# Patient Record
Sex: FEMALE | Race: WHITE | Employment: OTHER | URBAN - METROPOLITAN AREA
[De-identification: names, ages, dates, MRNs, and addresses within clinical notes are randomized per-mention and may not be internally consistent; named-entity substitution may affect disease eponyms.]

---

## 2017-10-05 ENCOUNTER — TELEPHONE (OUTPATIENT)
Dept: PULMONOLOGY | Facility: CLINIC | Age: 57
End: 2017-10-05

## 2017-10-05 RX ORDER — ATORVASTATIN CALCIUM 10 MG/1
10 TABLET, FILM COATED ORAL DAILY
Qty: 90 TABLET | Refills: 4 | Status: SHIPPED | OUTPATIENT
Start: 2017-10-05 | End: 2017-11-09

## 2017-10-05 RX ORDER — LISINOPRIL 10 MG/1
10 TABLET ORAL DAILY
Qty: 90 TABLET | Refills: 4 | Status: SHIPPED | OUTPATIENT
Start: 2017-10-05 | End: 2017-11-09

## 2017-10-05 NOTE — TELEPHONE ENCOUNTER
Current Outpatient Prescriptions:  Atorvastatin Calcium 10 MG Oral Tab Take 1 tablet (10 mg total) by mouth daily. Disp: 90 tablet Rfl: 4   lisinopril 10 MG Oral Tab Take 1 tablet (10 mg total) by mouth daily.  Disp: 90 tablet Rfl: 4

## 2017-10-24 NOTE — TELEPHONE ENCOUNTER
Pt calling back - states pharm never received rx - they faxed over something this morning to the office

## 2017-10-25 NOTE — TELEPHONE ENCOUNTER
Pt calling back again very upset states she never received a call back regarding if office received fax from pharmacy. Pt states pharmacy has not received scripts and is requesting to speak to RN. Please call [phone].  Please fax new scripts to [facility]

## 2017-10-25 NOTE — TELEPHONE ENCOUNTER
Madeline from [employer] states the pt has 2 profiles with 2 different addresses. Madeline pharmacist states the medications were received and are under the \[facility]\"" address.   Madeline pharmacist states she needs pt to verify which address is current

## 2017-10-25 NOTE — TELEPHONE ENCOUNTER
Christy Kade states her current address is the [place] address. [address] notified current address is [place] address. Benedict Callejas states he will merge the 2 accounts and mail atorvastatin and lisinopril [facility].   Ida add

## 2017-10-25 NOTE — TELEPHONE ENCOUNTER
[facility] did not receive prescriptions from 10-5-17. Madeline notified no fax received from [place]. Madeline notified this office will call [employer].

## 2017-11-08 NOTE — TELEPHONE ENCOUNTER
Pt states he is currently still having trouble obtaining his medication atorvastatin and  Lisinopril from mail order pharmacy due to insurance complications. Pt would like to receive a call back regarding if office has any samples of medications.  Please ca

## 2017-11-08 NOTE — TELEPHONE ENCOUNTER
LMTCB--no samples of any kind of med in clinic, does pt have a local pharm she wants a refill sent to?

## 2017-11-08 NOTE — TELEPHONE ENCOUNTER
LMTCB--stting no samples, cb if she would like to leave the name of a local pharmacy to send refills to

## 2017-11-09 RX ORDER — LISINOPRIL 10 MG/1
10 TABLET ORAL DAILY
Qty: 90 TABLET | Refills: 0 | Status: SHIPPED | OUTPATIENT
Start: 2017-11-09

## 2017-11-09 RX ORDER — ATORVASTATIN CALCIUM 10 MG/1
10 TABLET, FILM COATED ORAL DAILY
Qty: 90 TABLET | Refills: 0 | Status: SHIPPED | OUTPATIENT
Start: 2017-11-09 | End: 2021-11-24

## 2017-11-09 NOTE — TELEPHONE ENCOUNTER
LOV: 8/22/16  LRF: 10/5/17    Pt requesting refill be sent to local pharm, as she is having ins issues with mail order pharm.     rx sent to [facility]

## 2018-06-28 ENCOUNTER — OFFICE VISIT (OUTPATIENT)
Dept: PULMONOLOGY | Facility: CLINIC | Age: 58
End: 2018-06-28

## 2018-06-28 VITALS
SYSTOLIC BLOOD PRESSURE: 125 MMHG | DIASTOLIC BLOOD PRESSURE: 85 MMHG | HEART RATE: 69 BPM | RESPIRATION RATE: 18 BRPM | BODY MASS INDEX: 30.01 KG/M2 | WEIGHT: 198 LBS | HEIGHT: 68 IN | OXYGEN SATURATION: 98 %

## 2018-06-28 DIAGNOSIS — I11.9 HYPERTENSIVE HEART DISEASE WITHOUT HEART FAILURE: Primary | ICD-10-CM

## 2018-06-28 DIAGNOSIS — E78.5 DYSLIPIDEMIA: ICD-10-CM

## 2018-06-28 PROCEDURE — 99213 OFFICE O/P EST LOW 20 MIN: CPT | Performed by: INTERNAL MEDICINE

## 2018-06-28 PROCEDURE — 99212 OFFICE O/P EST SF 10 MIN: CPT | Performed by: INTERNAL MEDICINE

## 2018-06-28 NOTE — PROGRESS NOTES
The patient is 53-year-old female who I know well from prior evaluation comes in for follow-up. She notes that she is doing well. She had some tingling in her hands.     Review of systems: Vision normal. Ear nose and throat normal. Bowel normal. Bladder f

## 2018-07-23 ENCOUNTER — APPOINTMENT (OUTPATIENT)
Dept: LAB | Facility: HOSPITAL | Age: 58
End: 2018-07-23
Attending: INTERNAL MEDICINE
Payer: COMMERCIAL

## 2018-07-23 DIAGNOSIS — E78.5 DYSLIPIDEMIA: ICD-10-CM

## 2018-07-23 LAB
ALBUMIN SERPL BCP-MCNC: 4.6 G/DL (ref 3.5–4.8)
ALP SERPL-CCNC: 71 U/L (ref 32–100)
ALT SERPL-CCNC: 62 U/L (ref 14–54)
AST SERPL-CCNC: 37 U/L (ref 15–41)
BILIRUB DIRECT SERPL-MCNC: 0.2 MG/DL (ref 0–0.2)
BILIRUB SERPL-MCNC: 0.6 MG/DL (ref 0.3–1.2)
CHOLEST SERPL-MCNC: 219 MG/DL (ref 110–200)
HDLC SERPL-MCNC: 49 MG/DL
LDLC SERPL CALC-MCNC: 127 MG/DL (ref 0–99)
NONHDLC SERPL-MCNC: 170 MG/DL
PROT SERPL-MCNC: 7.4 G/DL (ref 5.9–8.4)
TRIGL SERPL-MCNC: 217 MG/DL (ref 1–149)

## 2018-07-23 PROCEDURE — 80061 LIPID PANEL: CPT

## 2018-07-23 PROCEDURE — 80076 HEPATIC FUNCTION PANEL: CPT

## 2018-07-23 PROCEDURE — 36415 COLL VENOUS BLD VENIPUNCTURE: CPT

## 2018-07-30 ENCOUNTER — TELEPHONE (OUTPATIENT)
Dept: PULMONOLOGY | Facility: CLINIC | Age: 58
End: 2018-07-30

## 2018-07-30 DIAGNOSIS — E78.5 HYPERLIPIDEMIA, UNSPECIFIED HYPERLIPIDEMIA TYPE: Primary | ICD-10-CM

## 2018-07-30 NOTE — TELEPHONE ENCOUNTER
----- Message from Evangelist Erickson MD sent at 7/26/2018  9:24 PM CDT -----  RN, please call the patient and clarify if she has actually been taking her atorvastatin. Her cholesterol jumped significantly.   If she has been taking the 10 mg dose, increase t

## 2018-07-31 NOTE — TELEPHONE ENCOUNTER
Pt reports taking atorvastatin 10 mg, she does report she was just on vacation prior to having labs drawn and did not eat well, consumed alcohol, and may have skipped some doses.  Pt would like to continue with Atorvastatin 10 mg daily and watch a healthy d

## 2018-10-05 ENCOUNTER — TELEPHONE (OUTPATIENT)
Dept: PULMONOLOGY | Facility: CLINIC | Age: 58
End: 2018-10-05

## 2018-12-19 ENCOUNTER — APPOINTMENT (OUTPATIENT)
Dept: LAB | Facility: HOSPITAL | Age: 58
End: 2018-12-19
Attending: INTERNAL MEDICINE
Payer: COMMERCIAL

## 2018-12-19 DIAGNOSIS — E78.5 HYPERLIPIDEMIA, UNSPECIFIED HYPERLIPIDEMIA TYPE: ICD-10-CM

## 2018-12-19 PROCEDURE — 80061 LIPID PANEL: CPT

## 2018-12-19 PROCEDURE — 80076 HEPATIC FUNCTION PANEL: CPT

## 2018-12-19 PROCEDURE — 36415 COLL VENOUS BLD VENIPUNCTURE: CPT

## 2019-01-04 RX ORDER — ATORVASTATIN CALCIUM 10 MG/1
TABLET, FILM COATED ORAL
Qty: 90 TABLET | Refills: 0 | Status: SHIPPED | OUTPATIENT
Start: 2019-01-04 | End: 2019-04-16

## 2019-01-04 RX ORDER — LISINOPRIL 10 MG/1
TABLET ORAL
Qty: 90 TABLET | Refills: 0 | Status: SHIPPED | OUTPATIENT
Start: 2019-01-04 | End: 2019-04-16

## 2019-04-16 RX ORDER — ATORVASTATIN CALCIUM 10 MG/1
TABLET, FILM COATED ORAL
Qty: 90 TABLET | Refills: 0 | Status: SHIPPED | OUTPATIENT
Start: 2019-04-16 | End: 2019-07-08

## 2019-04-16 RX ORDER — LISINOPRIL 10 MG/1
TABLET ORAL
Qty: 90 TABLET | Refills: 0 | Status: SHIPPED | OUTPATIENT
Start: 2019-04-16 | End: 2019-07-08

## 2019-04-16 NOTE — TELEPHONE ENCOUNTER
Requested medication:   LISINOPRIL 10 MG Oral Tab  Last refilled: 1/4/19 #90 tab with 0 refills    ATORVASTATIN 10 MG Oral Tab  Last refilled: 1/4/19 #90 tab with 0 refills    LOV: 6/28/18   No future appointments. Assessment and plan:  1.   Occasional t

## 2019-07-08 RX ORDER — ATORVASTATIN CALCIUM 10 MG/1
TABLET, FILM COATED ORAL
Qty: 90 TABLET | Refills: 0 | Status: SHIPPED | OUTPATIENT
Start: 2019-07-08 | End: 2019-09-23

## 2019-07-08 RX ORDER — LISINOPRIL 10 MG/1
TABLET ORAL
Qty: 90 TABLET | Refills: 0 | Status: SHIPPED | OUTPATIENT
Start: 2019-07-08 | End: 2019-09-23

## 2019-07-26 ENCOUNTER — TELEPHONE (OUTPATIENT)
Dept: PULMONOLOGY | Facility: CLINIC | Age: 59
End: 2019-07-26

## 2019-07-26 NOTE — TELEPHONE ENCOUNTER
Incoming call from [facility] office stating they received a fax stating TANNER Ro ordered labs that pt missed.  office was wondering why did they received this fax. Informed  office to disregard fax. Fax should've went directly to pt.   v

## 2019-09-23 RX ORDER — LISINOPRIL 10 MG/1
TABLET ORAL
Qty: 90 TABLET | Refills: 0 | Status: SHIPPED | OUTPATIENT
Start: 2019-09-23 | End: 2019-12-11

## 2019-09-23 RX ORDER — ATORVASTATIN CALCIUM 10 MG/1
TABLET, FILM COATED ORAL
Qty: 90 TABLET | Refills: 0 | Status: SHIPPED | OUTPATIENT
Start: 2019-09-23 | End: 2019-12-11

## 2019-09-23 NOTE — TELEPHONE ENCOUNTER
Lisinopril  Last filled: 7-8-19 #90 tab with 0 refills    Atorvastatin  Last filled: 7-8-19 #90 tab with 0 refills    Last seen: 6-28-18      Please advise.

## 2019-12-11 RX ORDER — ATORVASTATIN CALCIUM 10 MG/1
TABLET, FILM COATED ORAL
Qty: 90 TABLET | Refills: 0 | Status: SHIPPED | OUTPATIENT
Start: 2019-12-11 | End: 2020-02-28

## 2019-12-11 RX ORDER — LISINOPRIL 10 MG/1
TABLET ORAL
Qty: 90 TABLET | Refills: 0 | Status: SHIPPED | OUTPATIENT
Start: 2019-12-11 | End: 2020-02-28

## 2020-02-28 RX ORDER — LISINOPRIL 10 MG/1
TABLET ORAL
Qty: 90 TABLET | Refills: 0 | Status: SHIPPED | OUTPATIENT
Start: 2020-02-28 | End: 2020-05-21

## 2020-02-28 RX ORDER — ATORVASTATIN CALCIUM 10 MG/1
TABLET, FILM COATED ORAL
Qty: 90 TABLET | Refills: 0 | Status: SHIPPED | OUTPATIENT
Start: 2020-02-28 | End: 2020-05-21

## 2020-05-22 RX ORDER — ATORVASTATIN CALCIUM 10 MG/1
TABLET, FILM COATED ORAL
Qty: 90 TABLET | Refills: 5 | Status: SHIPPED | OUTPATIENT
Start: 2020-05-22 | End: 2021-08-05

## 2020-05-22 RX ORDER — LISINOPRIL 10 MG/1
TABLET ORAL
Qty: 90 TABLET | Refills: 5 | Status: SHIPPED | OUTPATIENT
Start: 2020-05-22 | End: 2021-08-12

## 2020-05-25 RX ORDER — LISINOPRIL 10 MG/1
TABLET ORAL
Qty: 90 TABLET | Refills: 1 | Status: SHIPPED | OUTPATIENT
Start: 2020-05-25 | End: 2021-11-23

## 2020-05-25 RX ORDER — ATORVASTATIN CALCIUM 10 MG/1
TABLET, FILM COATED ORAL
Qty: 90 TABLET | Refills: 1 | Status: SHIPPED | OUTPATIENT
Start: 2020-05-25 | End: 2021-11-24

## 2020-07-07 ENCOUNTER — TELEPHONE (OUTPATIENT)
Dept: PULMONOLOGY | Facility: CLINIC | Age: 60
End: 2020-07-07

## 2020-07-10 NOTE — TELEPHONE ENCOUNTER
Letter placed in MD's folder for signature.
Madeline retana she was told her membership would be suspended if she has a doctor's note. She stts she doesn't feel comfortable going back to the gym yet.  Pt requests letter stating that she has diminished lung capacity & she should refrain from going to the gym
Patient returned call. Please call - unable to reach RN. Thank you.
Pt called to request a letter for her gym membership. She would like to suspend it due to concerns about Covid. Pt needs a letter from Dr. Wei Ross stating that she has diminished lung capacity so she can't return until things settle down. Please call.
RN, please generate a letter for this patient stating that she has a history of sarcoidosis with hypertensive heart disease and dyspnea syndrome and I discouraged her from pursuing gym activity at this time.
The letter has been signed by Dr. Marlon Bansal. I informed the patient the letter is ready for . She would like the letter mailed to her home. I verified her address and mailed the letter to her home.
Tad Pritchett(Attending)

## 2020-08-05 ENCOUNTER — TELEPHONE (OUTPATIENT)
Dept: PULMONOLOGY | Facility: CLINIC | Age: 60
End: 2020-08-05

## 2020-08-05 NOTE — TELEPHONE ENCOUNTER
Also see TE from 7/7/20 regarding letter for gym - patient states she has not received letter. Please mail to     [address], [address]    For additional questions please call. Thank you.

## 2020-08-06 NOTE — TELEPHONE ENCOUNTER
Reviewed TE from 7/7/20. Letter was mailed to her home address on 7/10/20 after confirming with the patient.      Please note the differences below in the chart and reported by the patient:    Chart:  Janelle Gu    Reported:  48

## 2020-09-23 ENCOUNTER — TELEPHONE (OUTPATIENT)
Dept: PULMONOLOGY | Facility: CLINIC | Age: 60
End: 2020-09-23

## 2020-09-23 NOTE — TELEPHONE ENCOUNTER
Pt informed she can get it anytime from now, we generally recommend end of September of October so she will be covered the full length of flu season. Pt states she gets it somewhere closer to home for free.

## 2021-08-02 NOTE — TELEPHONE ENCOUNTER
LOV: 6/28/2018  Last refill: 5/25/20    Dr. Afia Vincent review and sign pended order if agreeable. *patinet informed office visit needed. Patient will call back in a few days.

## 2021-08-05 RX ORDER — ATORVASTATIN CALCIUM 10 MG/1
TABLET, FILM COATED ORAL
Qty: 90 TABLET | Refills: 0 | Status: SHIPPED | OUTPATIENT
Start: 2021-08-05 | End: 2021-11-23

## 2021-08-11 NOTE — TELEPHONE ENCOUNTER
Pt. States that pharm should have sent a refill request last week for Lisinopril. Current Outpatient Medications   Medication Sig Dispense Refill   •       • LISINOPRIL 10 MG Oral Tab TAKE 1 TABLET BY MOUTH DAILY.  90 tablet 1   •   90 tablet 1   • LI

## 2021-08-12 RX ORDER — LISINOPRIL 10 MG/1
10 TABLET ORAL DAILY
Qty: 90 TABLET | Refills: 0 | Status: SHIPPED | OUTPATIENT
Start: 2021-08-12

## 2021-08-12 NOTE — TELEPHONE ENCOUNTER
Last seen 6-28-18   Upcoming appt 11-23-21  Last refill 5-   Routed to [facility] for sign off

## 2021-11-23 ENCOUNTER — OFFICE VISIT (OUTPATIENT)
Dept: PULMONOLOGY | Facility: CLINIC | Age: 61
End: 2021-11-23
Payer: COMMERCIAL

## 2021-11-23 VITALS
WEIGHT: 200 LBS | HEART RATE: 76 BPM | RESPIRATION RATE: 18 BRPM | DIASTOLIC BLOOD PRESSURE: 85 MMHG | SYSTOLIC BLOOD PRESSURE: 123 MMHG | BODY MASS INDEX: 29.96 KG/M2 | HEIGHT: 68.5 IN | OXYGEN SATURATION: 98 %

## 2021-11-23 DIAGNOSIS — E78.5 DYSLIPIDEMIA: Primary | ICD-10-CM

## 2021-11-23 DIAGNOSIS — I11.9 HYPERTENSIVE HEART DISEASE WITHOUT HEART FAILURE: ICD-10-CM

## 2021-11-23 PROCEDURE — 3008F BODY MASS INDEX DOCD: CPT | Performed by: INTERNAL MEDICINE

## 2021-11-23 PROCEDURE — 3079F DIAST BP 80-89 MM HG: CPT | Performed by: INTERNAL MEDICINE

## 2021-11-23 PROCEDURE — 3074F SYST BP LT 130 MM HG: CPT | Performed by: INTERNAL MEDICINE

## 2021-11-23 PROCEDURE — 99213 OFFICE O/P EST LOW 20 MIN: CPT | Performed by: INTERNAL MEDICINE

## 2021-11-23 RX ORDER — ATORVASTATIN CALCIUM 10 MG/1
10 TABLET, FILM COATED ORAL DAILY
Qty: 90 TABLET | Refills: 3 | Status: SHIPPED | OUTPATIENT
Start: 2021-11-23 | End: 2021-11-24

## 2021-11-23 RX ORDER — LISINOPRIL 10 MG/1
10 TABLET ORAL DAILY
Qty: 90 TABLET | Refills: 3 | Status: SHIPPED | OUTPATIENT
Start: 2021-11-23

## 2021-11-23 NOTE — PROGRESS NOTES
The patient is a 43-year-old female who [place] from prior evaluation who comes in now for follow-up. She notes that she is doing well. She has no new complaints. No new rashes. She is up-to-date with colonoscopy and mammography.   Her blood pressure

## 2021-11-24 ENCOUNTER — TELEPHONE (OUTPATIENT)
Dept: PULMONOLOGY | Facility: CLINIC | Age: 61
End: 2021-11-24

## 2021-11-24 ENCOUNTER — LAB ENCOUNTER (OUTPATIENT)
Dept: LAB | Facility: HOSPITAL | Age: 61
End: 2021-11-24
Attending: INTERNAL MEDICINE
Payer: COMMERCIAL

## 2021-11-24 DIAGNOSIS — E78.5 DYSLIPIDEMIA: Primary | ICD-10-CM

## 2021-11-24 DIAGNOSIS — E78.5 DYSLIPIDEMIA: ICD-10-CM

## 2021-11-24 PROCEDURE — 80076 HEPATIC FUNCTION PANEL: CPT

## 2021-11-24 PROCEDURE — 36415 COLL VENOUS BLD VENIPUNCTURE: CPT

## 2021-11-24 PROCEDURE — 80061 LIPID PANEL: CPT

## 2021-11-24 RX ORDER — ATORVASTATIN CALCIUM 20 MG/1
20 TABLET, FILM COATED ORAL NIGHTLY
Qty: 30 TABLET | Refills: 5 | Status: SHIPPED | OUTPATIENT
Start: 2021-11-24 | End: 2021-12-30

## 2021-11-24 NOTE — TELEPHONE ENCOUNTER
----- Message from Vladislav Shah MD sent at 11/24/2021  2:44 PM CST -----  RN, please send in a prescription for atorvastatin 20 mg daily.   Please add to the calendar a repeat lipid panel at the 6-month interval.

## 2021-12-30 ENCOUNTER — TELEPHONE (OUTPATIENT)
Dept: PULMONOLOGY | Facility: CLINIC | Age: 61
End: 2021-12-30

## 2021-12-30 NOTE — TELEPHONE ENCOUNTER
pt. requesting to get New Rx for Atorvastatin 20mg sent to the correct pharm, which is [facility]. Need 90 day supply for insur to cover. Need New Rx for 90 days. Pt. States that the Rx entered in November went to the old pharm.

## 2021-12-30 NOTE — TELEPHONE ENCOUNTER
Last office visit; 11/23/21  Last refill: 11/24/21    DR. Garcia, patient Is requesting refill for Atorvastatin.

## 2021-12-31 RX ORDER — ATORVASTATIN CALCIUM 20 MG/1
20 TABLET, FILM COATED ORAL NIGHTLY
Qty: 90 TABLET | Refills: 1 | Status: SHIPPED | OUTPATIENT
Start: 2021-12-31

## 2022-04-01 ENCOUNTER — TELEPHONE (OUTPATIENT)
Dept: PULMONOLOGY | Facility: CLINIC | Age: 62
End: 2022-04-01

## 2022-05-09 ENCOUNTER — TELEPHONE (OUTPATIENT)
Dept: PULMONOLOGY | Facility: CLINIC | Age: 62
End: 2022-05-09

## 2022-05-09 NOTE — TELEPHONE ENCOUNTER
[facility] has questions regarding Atorvastatin tabs 20 mg. Please call with ref# [id number]. Thank you.

## 2022-06-02 ENCOUNTER — LAB ENCOUNTER (OUTPATIENT)
Dept: LAB | Facility: HOSPITAL | Age: 62
End: 2022-06-02
Attending: INTERNAL MEDICINE
Payer: COMMERCIAL

## 2022-06-02 DIAGNOSIS — E78.5 DYSLIPIDEMIA: ICD-10-CM

## 2022-06-02 LAB
CHOLEST SERPL-MCNC: 204 MG/DL (ref ?–200)
FASTING PATIENT LIPID ANSWER: YES
HDLC SERPL-MCNC: 52 MG/DL (ref 40–59)
LDLC SERPL CALC-MCNC: 117 MG/DL (ref ?–100)
NONHDLC SERPL-MCNC: 152 MG/DL (ref ?–130)
TRIGL SERPL-MCNC: 203 MG/DL (ref 30–149)
VLDLC SERPL CALC-MCNC: 36 MG/DL (ref 0–30)

## 2022-06-02 PROCEDURE — 36415 COLL VENOUS BLD VENIPUNCTURE: CPT

## 2022-06-02 PROCEDURE — 80061 LIPID PANEL: CPT

## 2022-06-03 RX ORDER — ATORVASTATIN CALCIUM 20 MG/1
TABLET, FILM COATED ORAL
Qty: 90 TABLET | Refills: 3 | Status: SHIPPED | OUTPATIENT
Start: 2022-06-03

## 2022-06-03 NOTE — TELEPHONE ENCOUNTER
LOV: 11/23/21  Last refill: 12/31/21      Dr. Reena Flores -please review and sign pended order if agreeable.

## 2022-08-17 ENCOUNTER — TELEPHONE (OUTPATIENT)
Dept: PULMONOLOGY | Facility: CLINIC | Age: 62
End: 2022-08-17

## 2022-08-17 NOTE — TELEPHONE ENCOUNTER
Pt is sched to have R foot surgery on 8/26/2022, with Dr Martin Meyer, and requesting pts latest progress note and need clearance note. Fax # [phone].

## 2022-08-17 NOTE — TELEPHONE ENCOUNTER
Dr. Cho [facility] requesting clearance for foot surgery on 8/26/22. Patient last seen 11/23/2021. Office aware that you are out until 8/22/22. Does patient require office visit?

## 2022-08-17 NOTE — TELEPHONE ENCOUNTER
Milly Kimball MD  You 2 hours ago (12:43 PM)         RN, this patient does not need to be seen in the office for surgical clearance. You can provide my surgical clearance without an office visit. She is OK to proceed to the operating room.     Message text

## 2022-08-18 NOTE — TELEPHONE ENCOUNTER
RN,  I signed the letter but you will need to print from your desktop as I cannot connect to the office printer.

## 2022-08-26 ENCOUNTER — LAB REQUISITION (OUTPATIENT)
Dept: LAB | Facility: HOSPITAL | Age: 62
End: 2022-08-26
Payer: COMMERCIAL

## 2022-08-26 DIAGNOSIS — S86.311A STRAIN OF MUSCLE(S) AND TENDON(S) OF PERONEAL MUSCLE GROUP AT LOWER LEG LEVEL, RIGHT LEG, INITIAL ENCOUNTER: ICD-10-CM

## 2022-08-26 DIAGNOSIS — M89.371 HYPERTROPHY OF BONE, RIGHT ANKLE AND FOOT: ICD-10-CM

## 2022-08-26 PROCEDURE — 88305 TISSUE EXAM BY PATHOLOGIST: CPT | Performed by: PODIATRIST

## 2022-08-26 PROCEDURE — 88311 DECALCIFY TISSUE: CPT | Performed by: PODIATRIST

## 2022-11-22 RX ORDER — LISINOPRIL 10 MG/1
TABLET ORAL
Qty: 90 TABLET | Refills: 3 | Status: SHIPPED | OUTPATIENT
Start: 2022-11-22

## 2022-11-22 NOTE — TELEPHONE ENCOUNTER
LOV: 11/23/2021  Last refill: 11/23/21    Dr. Sera Fry review and sign pended prescription if agreebale.

## 2023-05-11 RX ORDER — ATORVASTATIN CALCIUM 20 MG/1
TABLET, FILM COATED ORAL
Qty: 90 TABLET | Refills: 0 | Status: SHIPPED | OUTPATIENT
Start: 2023-05-11

## 2023-05-11 NOTE — TELEPHONE ENCOUNTER
LOV: 11/23/2021  Last refill: 6/03/2022    *patient informed follow-up required  Dr. Zeyad Leal review and sign pended prescription if agreeable.

## 2023-06-29 ENCOUNTER — LAB ENCOUNTER (OUTPATIENT)
Dept: LAB | Facility: HOSPITAL | Age: 63
End: 2023-06-29
Attending: INTERNAL MEDICINE
Payer: COMMERCIAL

## 2023-06-29 ENCOUNTER — OFFICE VISIT (OUTPATIENT)
Dept: PULMONOLOGY | Facility: CLINIC | Age: 63
End: 2023-06-29

## 2023-06-29 VITALS
HEIGHT: 68.5 IN | WEIGHT: 197 LBS | OXYGEN SATURATION: 99 % | BODY MASS INDEX: 29.52 KG/M2 | DIASTOLIC BLOOD PRESSURE: 82 MMHG | HEART RATE: 55 BPM | RESPIRATION RATE: 20 BRPM | SYSTOLIC BLOOD PRESSURE: 123 MMHG

## 2023-06-29 DIAGNOSIS — E78.5 DYSLIPIDEMIA: ICD-10-CM

## 2023-06-29 DIAGNOSIS — I11.9 HYPERTENSIVE HEART DISEASE WITHOUT HEART FAILURE: Primary | ICD-10-CM

## 2023-06-29 LAB
CHOLEST SERPL-MCNC: 193 MG/DL (ref ?–200)
FASTING PATIENT LIPID ANSWER: YES
HDLC SERPL-MCNC: 52 MG/DL (ref 40–59)
LDLC SERPL CALC-MCNC: 118 MG/DL (ref ?–100)
NONHDLC SERPL-MCNC: 141 MG/DL (ref ?–130)
TRIGL SERPL-MCNC: 131 MG/DL (ref 30–149)
VLDLC SERPL CALC-MCNC: 23 MG/DL (ref 0–30)

## 2023-06-29 PROCEDURE — 36415 COLL VENOUS BLD VENIPUNCTURE: CPT

## 2023-06-29 PROCEDURE — 3008F BODY MASS INDEX DOCD: CPT | Performed by: INTERNAL MEDICINE

## 2023-06-29 PROCEDURE — 80061 LIPID PANEL: CPT

## 2023-06-29 PROCEDURE — 99213 OFFICE O/P EST LOW 20 MIN: CPT | Performed by: INTERNAL MEDICINE

## 2023-06-29 PROCEDURE — 3079F DIAST BP 80-89 MM HG: CPT | Performed by: INTERNAL MEDICINE

## 2023-06-29 PROCEDURE — 3074F SYST BP LT 130 MM HG: CPT | Performed by: INTERNAL MEDICINE

## 2023-06-30 DIAGNOSIS — E78.5 DYSLIPIDEMIA: Primary | ICD-10-CM

## 2023-08-02 RX ORDER — ATORVASTATIN CALCIUM 20 MG/1
20 TABLET, FILM COATED ORAL DAILY
Qty: 90 TABLET | Refills: 3 | Status: SHIPPED | OUTPATIENT
Start: 2023-08-02

## 2023-08-02 NOTE — TELEPHONE ENCOUNTER
LOV: 6/29/2023  Last refill: 5/11/2023    Dr. Liza Saleem review and sign pended prescription if agreeable.

## 2023-11-10 RX ORDER — LISINOPRIL 10 MG/1
10 TABLET ORAL DAILY
Qty: 90 TABLET | Refills: 3 | Status: SHIPPED | OUTPATIENT
Start: 2023-11-10

## 2024-02-14 ENCOUNTER — TELEPHONE (OUTPATIENT)
Dept: PULMONOLOGY | Facility: CLINIC | Age: 64
End: 2024-02-14

## 2024-02-14 NOTE — TELEPHONE ENCOUNTER
Pt states that she has persistent cough with a lot of phlegm.  She also states she gets winded very easily.  Please call.

## 2024-02-15 RX ORDER — LEVOFLOXACIN 500 MG/1
500 TABLET, FILM COATED ORAL DAILY
Qty: 7 TABLET | Refills: 0 | Status: SHIPPED | OUTPATIENT
Start: 2024-02-15

## 2024-02-15 NOTE — TELEPHONE ENCOUNTER
Spoke with patient. Patient reports cough with green/milky white phelgm, nasal drainage, rattling at night, gets winded quicker. O2sat 97-98%. Patient has had this cough for 3 weeks. Patient has tried Sudafed and Robitussin and did not help much. Patient requesting further recommendations.

## 2024-02-26 ENCOUNTER — TELEPHONE (OUTPATIENT)
Dept: PULMONOLOGY | Facility: CLINIC | Age: 64
End: 2024-02-26

## 2024-02-26 DIAGNOSIS — R05.9 COUGH, UNSPECIFIED TYPE: Primary | ICD-10-CM

## 2024-02-26 RX ORDER — CODEINE PHOSPHATE AND GUAIFENESIN 10; 100 MG/5ML; MG/5ML
5 SOLUTION ORAL EVERY 6 HOURS PRN
Qty: 118 ML | Refills: 0 | Status: SHIPPED | OUTPATIENT
Start: 2024-02-26

## 2024-02-26 NOTE — TELEPHONE ENCOUNTER
Patient wants the nurse to know that she finished taking her antibiotic, but she continues to have a cough.

## 2024-02-26 NOTE — TELEPHONE ENCOUNTER
Spoke with patient and informed of Dr. Garcia's order below. Patient agreeable. Verified pharmacy.     Dr. Garcia: Please review/sign pended order.

## 2024-02-26 NOTE — TELEPHONE ENCOUNTER
Spoke with patient states she is coughing, but not as bad, with yellowish/milky sputum instead of green sputum from last week, feels like she has mucous in her lungs, but cannot get out. Finished antibiotics on Friday, taking Robitussin DM. Asking for further recommendations.

## 2024-05-16 ENCOUNTER — PATIENT MESSAGE (OUTPATIENT)
Dept: PULMONOLOGY | Facility: CLINIC | Age: 64
End: 2024-05-16

## 2024-05-17 ENCOUNTER — TELEPHONE (OUTPATIENT)
Dept: PULMONOLOGY | Facility: CLINIC | Age: 64
End: 2024-05-17

## 2024-05-17 NOTE — TELEPHONE ENCOUNTER
From: Madeline Littlejohn  To: Ag Garcia  Sent: 5/16/2024 8:32 PM CDT  Subject: Covid vaccine     I am going n a trip to [place] (cruise from [place] to [place] on 9/23 to 10/4. I have gotten all Moderna vaccinations. My last buster was on 10/26/23. I also had my last flu shot on 10/13/23. Should I get another buster and flu shot before I travel? Please let me know

## 2024-05-17 NOTE — TELEPHONE ENCOUNTER
Last covid booster- 10/26/23  Last flu shot- 10/13/23  Dr Garcia- do you recommend another Covid booster and flu shot prior to her travel to [place]?

## 2024-05-17 NOTE — TELEPHONE ENCOUNTER
----- Message from [employer]  sent at 5/16/2024  8:32 PM CDT -----  Regarding: Covid vaccine   Contact: [phone]  I am going n a trip to [place] (cruise from [facility] to [place] on 9/23 to 10/4. I have gotten all Moderna vaccinations. My last buster was on 10/26/23. I also had my last flu shot on 10/13/23.  Should I get another buster and flu shot before I travel? Please let me know

## 2024-05-17 NOTE — TELEPHONE ENCOUNTER
RN, please call the patient and let her know that she does not need another flu booster.  However, she would benefit from another COVID booster.

## 2024-06-07 ENCOUNTER — LAB ENCOUNTER (OUTPATIENT)
Dept: LAB | Facility: HOSPITAL | Age: 64
End: 2024-06-07
Attending: INTERNAL MEDICINE
Payer: COMMERCIAL

## 2024-06-07 DIAGNOSIS — E78.5 DYSLIPIDEMIA: ICD-10-CM

## 2024-06-07 LAB
CHOLEST SERPL-MCNC: 201 MG/DL (ref ?–200)
FASTING PATIENT LIPID ANSWER: YES
HDLC SERPL-MCNC: 42 MG/DL (ref 40–59)
LDLC SERPL CALC-MCNC: 125 MG/DL (ref ?–100)
NONHDLC SERPL-MCNC: 159 MG/DL (ref ?–130)
TRIGL SERPL-MCNC: 190 MG/DL (ref 30–149)
VLDLC SERPL CALC-MCNC: 34 MG/DL (ref 0–30)

## 2024-06-07 PROCEDURE — 36415 COLL VENOUS BLD VENIPUNCTURE: CPT

## 2024-06-07 PROCEDURE — 80061 LIPID PANEL: CPT

## 2024-06-13 ENCOUNTER — TELEPHONE (OUTPATIENT)
Dept: PULMONOLOGY | Facility: CLINIC | Age: 64
End: 2024-06-13

## 2024-06-13 NOTE — TELEPHONE ENCOUNTER
Patient is calling in to speak to Dr. Garcia patient just states she is taking cholesterol medicine please call

## 2024-06-13 NOTE — TELEPHONE ENCOUNTER
RN, I spoke to the patient.  Please add to the calendar a repeat lipid panel in 1 year.  She is taking her medicine, but had not been paying any attention to diet and had stopped exercising.  She is going to make behavioral modification.

## 2024-07-29 NOTE — TELEPHONE ENCOUNTER
Last seen: 6/29/23  Suggested follow up: 1 year  Next appointment: not yet scheduled  Last refill: 8/2/23  Last lipid: 6/7/24

## 2024-08-01 RX ORDER — ATORVASTATIN CALCIUM 20 MG/1
20 TABLET, FILM COATED ORAL DAILY
Qty: 90 TABLET | Refills: 3 | OUTPATIENT
Start: 2024-08-01

## 2024-08-05 NOTE — TELEPHONE ENCOUNTER
Patient calling back, scheduled for 2/27/25 and added to wait list. Only available states too far out if able to assist regards refill. Please call.

## 2024-08-05 NOTE — TELEPHONE ENCOUNTER
Attempted to contact patient, left message requesting call back.    Upon callback, please offer 10/29/24 at 4:45 PM and confirm PCP.

## 2024-09-05 ENCOUNTER — OFFICE VISIT (OUTPATIENT)
Dept: PULMONOLOGY | Facility: CLINIC | Age: 64
End: 2024-09-05
Payer: COMMERCIAL

## 2024-09-05 VITALS
SYSTOLIC BLOOD PRESSURE: 120 MMHG | BODY MASS INDEX: 30.62 KG/M2 | HEART RATE: 64 BPM | HEIGHT: 68.5 IN | WEIGHT: 204.38 LBS | OXYGEN SATURATION: 99 % | DIASTOLIC BLOOD PRESSURE: 80 MMHG | RESPIRATION RATE: 12 BRPM

## 2024-09-05 DIAGNOSIS — I11.9 HYPERTENSIVE HEART DISEASE WITHOUT HEART FAILURE: Primary | ICD-10-CM

## 2024-09-05 PROCEDURE — 3074F SYST BP LT 130 MM HG: CPT | Performed by: INTERNAL MEDICINE

## 2024-09-05 PROCEDURE — 3079F DIAST BP 80-89 MM HG: CPT | Performed by: INTERNAL MEDICINE

## 2024-09-05 PROCEDURE — 3008F BODY MASS INDEX DOCD: CPT | Performed by: INTERNAL MEDICINE

## 2024-09-05 PROCEDURE — 99213 OFFICE O/P EST LOW 20 MIN: CPT | Performed by: INTERNAL MEDICINE

## 2024-09-05 NOTE — PROGRESS NOTES
The patient is a 63-year-old female who I know well from prior evaluation comes in now for follow-up.  She has no new complaints.    Review of Systems:  Vision normal. Ear nose and throat normal. Bowel normal. Bladder function normal. No depression. No thyroid disease. No lymphatic system concerns.  No rash. Muscles and joints unremarkable. No weight loss no weight gain.    Physical Examination:  Vital signs normal. HEENT examination is unremarkable with pupils equal round and reactive to light and accommodation. Neck without adenopathy, thyromegaly, JVD nor bruit. Lungs clear to auscultation and percussion. Cardiac regular rate and rhythm no murmur. Abdomen nontender, without hepatosplenomegaly and no mass appreciable. Extremities and Musculoskeletal without clubbing cyanosis nor edema, and mobility acceptable. Neurologic grossly intact with symmetric tone and strength and reflex.    Assessment and plan:  1.  Screening for coronary artery disease-the patient will get a coronary calcium screening CT.  2.  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  3.  Family history of breast cancer-patient gets serial mammography through her gynecologist  4.  Hypertension-continue current management  5.  Dyslipidemia-continue current management  6.  General Health-the patient should never drink and drive, always wear a safety belt, have a smoke detector and fire extiguisher in the house, avoid the use of candles in the home as they are the most common cause of housefire, and see me annually for complete examination.  7.  Screening colonoscopy-due in 2031.  8.  Right meniscal knee disease.

## 2024-10-28 RX ORDER — ATORVASTATIN CALCIUM 20 MG/1
20 TABLET, FILM COATED ORAL DAILY
Qty: 90 TABLET | Refills: 3 | Status: SHIPPED | OUTPATIENT
Start: 2024-10-28

## 2024-10-28 RX ORDER — LISINOPRIL 10 MG/1
10 TABLET ORAL DAILY
Qty: 90 TABLET | Refills: 3 | Status: SHIPPED | OUTPATIENT
Start: 2024-10-28

## 2024-10-28 NOTE — TELEPHONE ENCOUNTER
Last seen: 9/5/24  Suggested follow up: 1 year  Next appointment: 2/27/25  Last refills: 8/8/24 & 11/10/23    Refill pended, please review/sign if agreeable.

## 2025-05-05 ENCOUNTER — TELEPHONE (OUTPATIENT)
Dept: PULMONOLOGY | Facility: CLINIC | Age: 65
End: 2025-05-05

## 2025-05-05 DIAGNOSIS — E78.5 HYPERLIPIDEMIA, UNSPECIFIED HYPERLIPIDEMIA TYPE: Primary | ICD-10-CM

## 2025-05-05 NOTE — TELEPHONE ENCOUNTER
Dr. Garcia patient is due for lipid panel please review/sign pended order if aggreeable.    Regarding: Lipid panel  Lipid Panel due 6/7/25

## (undated) NOTE — LETTER
8/17/2022              [id number]           Patient is cleared for surgery from a pulmonary perspective.           Sincerely,          Tracey Joya MD  [address]  [address]  [place] [id number]  [phone]

## (undated) NOTE — LETTER
RENU MONGE  [address] [facility] [id number]      4/1/2022    Dear Yovanny Pride,    It has come to our attention that you are due for: Lipid blood test in May. This test was ordered by Dr. Nathaly Cooley. If you have any questions, please call the office at [phone].        Thank you,         The Pulmonary Clinical Staff

## (undated) NOTE — LETTER
7/8/2020             RE: Alejandra Littlejohn         [address]        [facility] [address]         To Whom It May Concern:    Ranjan Tidwell has a history of sarcoidosis with hypertensive heart disease & dyspnea syndrome and I discouraged her from pursuing g